# Patient Record
Sex: MALE | Race: WHITE | NOT HISPANIC OR LATINO | Employment: FULL TIME | URBAN - METROPOLITAN AREA
[De-identification: names, ages, dates, MRNs, and addresses within clinical notes are randomized per-mention and may not be internally consistent; named-entity substitution may affect disease eponyms.]

---

## 2019-01-24 ENCOUNTER — OFFICE VISIT (OUTPATIENT)
Dept: URGENT CARE | Facility: PHYSICIAN GROUP | Age: 39
End: 2019-01-24
Payer: COMMERCIAL

## 2019-01-24 VITALS
HEART RATE: 74 BPM | TEMPERATURE: 98.6 F | OXYGEN SATURATION: 96 % | RESPIRATION RATE: 16 BRPM | HEIGHT: 69 IN | BODY MASS INDEX: 34.96 KG/M2 | SYSTOLIC BLOOD PRESSURE: 122 MMHG | WEIGHT: 236 LBS | DIASTOLIC BLOOD PRESSURE: 70 MMHG

## 2019-01-24 DIAGNOSIS — J02.9 PHARYNGITIS, UNSPECIFIED ETIOLOGY: ICD-10-CM

## 2019-01-24 DIAGNOSIS — Z20.818 EXPOSURE TO STREP THROAT: ICD-10-CM

## 2019-01-24 LAB
INT CON NEG: NEGATIVE
INT CON POS: POSITIVE
S PYO AG THROAT QL: NORMAL

## 2019-01-24 PROCEDURE — 87880 STREP A ASSAY W/OPTIC: CPT | Performed by: PHYSICIAN ASSISTANT

## 2019-01-24 PROCEDURE — 99203 OFFICE O/P NEW LOW 30 MIN: CPT | Performed by: PHYSICIAN ASSISTANT

## 2019-01-24 RX ORDER — AMOXICILLIN 500 MG/1
500 CAPSULE ORAL 3 TIMES DAILY
Qty: 30 CAP | Refills: 0 | Status: SHIPPED | OUTPATIENT
Start: 2019-01-24 | End: 2019-02-03

## 2019-01-24 ASSESSMENT — ENCOUNTER SYMPTOMS
SHORTNESS OF BREATH: 0
CHILLS: 0
MYALGIAS: 0
TROUBLE SWALLOWING: 0
DIZZINESS: 0
NAUSEA: 0
SORE THROAT: 1
PALPITATIONS: 0
HEADACHES: 0
COUGH: 1
BLURRED VISION: 0
EYE PAIN: 0
SWOLLEN GLANDS: 0
VOMITING: 0
FEVER: 0

## 2019-01-24 NOTE — PROGRESS NOTES
"  Subjective:   Aaron Flowers a 38 y.o. male who presents for Sore Throat (wife was dx w/ strep, )      Pharyngitis    This is a new problem. The current episode started today. The problem has been unchanged. Neither side of throat is experiencing more pain than the other. There has been no fever. The pain is mild. Associated symptoms include coughing. Pertinent negatives include no congestion, drooling, ear pain, headaches, shortness of breath, swollen glands, trouble swallowing or vomiting. He has had exposure to strep. He has had no exposure to mono. Exposure to: His girlfriend was diagnosed with strep throat this morning. He has tried nothing for the symptoms.       Review of Systems   Constitutional: Negative for chills, fever and malaise/fatigue.   HENT: Positive for sore throat. Negative for congestion, drooling, ear pain and trouble swallowing.    Eyes: Negative for blurred vision and pain.   Respiratory: Positive for cough. Negative for shortness of breath.    Cardiovascular: Negative for chest pain and palpitations.   Gastrointestinal: Negative for nausea and vomiting.   Musculoskeletal: Negative for myalgias.   Skin: Negative for rash.   Neurological: Negative for dizziness and headaches.        PMH:  has no past medical history on file.  MEDS:   Current Outpatient Prescriptions:   •  amoxicillin (AMOXIL) 500 MG Cap, Take 1 Cap by mouth 3 times a day for 10 days., Disp: 30 Cap, Rfl: 0  ALLERGIES: No Known Allergies  SURGHX: No past surgical history on file.  SOCHX:  reports that he has never smoked. He has never used smokeless tobacco. He reports that he uses drugs, including Marijuana.  FH: Family history was reviewed, no pertinent findings to report     Objective:   /70   Pulse 74   Temp 37 °C (98.6 °F)   Resp 16   Ht 1.753 m (5' 9\")   Wt 107 kg (236 lb)   SpO2 96%   BMI 34.85 kg/m²      Physical Exam   Constitutional: He is oriented to person, place, and time. He appears well-developed " and well-nourished.   HENT:   Head: Normocephalic and atraumatic.   Right Ear: Tympanic membrane, external ear and ear canal normal.   Left Ear: Tympanic membrane, external ear and ear canal normal.   Nose: Nose normal.   Mouth/Throat: Uvula is midline, oropharynx is clear and moist and mucous membranes are normal. Tonsils are 1+ on the right. Tonsils are 1+ on the left. No tonsillar exudate.   Eyes: Pupils are equal, round, and reactive to light. Conjunctivae are normal.   Neck: Normal range of motion.   Cardiovascular: Normal rate, regular rhythm and normal heart sounds.    No murmur heard.  Pulmonary/Chest: Effort normal and breath sounds normal. He has no wheezes.   Lymphadenopathy:     He has no cervical adenopathy.   Neurological: He is alert and oriented to person, place, and time.   Skin: Skin is warm and dry. Capillary refill takes less than 2 seconds.   Psychiatric: He has a normal mood and affect. His behavior is normal. Judgment normal.   Vitals reviewed.       POCT Rapid Strep A - Negative    Assessment/Plan:   1. Exposure to strep throat  - POCT Rapid Strep A  - amoxicillin (AMOXIL) 500 MG Cap; Take 1 Cap by mouth 3 times a day for 10 days.  Dispense: 30 Cap; Refill: 0    2. Pharyngitis, unspecified etiology  - POCT Rapid Strep A  - amoxicillin (AMOXIL) 500 MG Cap; Take 1 Cap by mouth 3 times a day for 10 days.  Dispense: 30 Cap; Refill: 0    *Contingent antibiotic prescription given to patient to be filled if there is no improvement in his sore throat over the next 2 days      Differential diagnosis, natural history, supportive care, and indications for immediate follow-up discussed.  Return if symptoms worsen or fail to improve.

## 2019-12-11 ENCOUNTER — OFFICE VISIT (OUTPATIENT)
Dept: URGENT CARE | Facility: PHYSICIAN GROUP | Age: 39
End: 2019-12-11
Payer: COMMERCIAL

## 2019-12-11 VITALS
RESPIRATION RATE: 12 BRPM | HEIGHT: 69 IN | SYSTOLIC BLOOD PRESSURE: 138 MMHG | OXYGEN SATURATION: 96 % | TEMPERATURE: 98.1 F | DIASTOLIC BLOOD PRESSURE: 102 MMHG | HEART RATE: 79 BPM | WEIGHT: 241.8 LBS | BODY MASS INDEX: 35.81 KG/M2

## 2019-12-11 DIAGNOSIS — R05.9 COUGH: ICD-10-CM

## 2019-12-11 DIAGNOSIS — J06.9 VIRAL URI: ICD-10-CM

## 2019-12-11 DIAGNOSIS — R11.2 NAUSEA AND VOMITING, INTRACTABILITY OF VOMITING NOT SPECIFIED, UNSPECIFIED VOMITING TYPE: ICD-10-CM

## 2019-12-11 DIAGNOSIS — R19.5 CHANGE IN STOOL: ICD-10-CM

## 2019-12-11 PROCEDURE — 99214 OFFICE O/P EST MOD 30 MIN: CPT | Performed by: PHYSICIAN ASSISTANT

## 2019-12-11 RX ORDER — LISINOPRIL 5 MG/1
5 TABLET ORAL DAILY
COMMUNITY
End: 2022-10-11

## 2019-12-11 RX ORDER — ALBUTEROL SULFATE 90 UG/1
2 AEROSOL, METERED RESPIRATORY (INHALATION) EVERY 6 HOURS PRN
Qty: 8.5 G | Refills: 2 | Status: SHIPPED | OUTPATIENT
Start: 2019-12-11 | End: 2022-10-11

## 2019-12-11 ASSESSMENT — ENCOUNTER SYMPTOMS
CONSTIPATION: 0
SHORTNESS OF BREATH: 0
FEVER: 0
BLOOD IN STOOL: 0
SPUTUM PRODUCTION: 0
COUGH: 1
CHILLS: 0
WHEEZING: 0
VOMITING: 0
DIARRHEA: 0
NAUSEA: 0
ABDOMINAL PAIN: 0

## 2019-12-11 NOTE — PROGRESS NOTES
"Subjective:   Aaron hKan  is a 39 y.o. male who presents for Nausea (vomiting, upset stomach, cough, cold sweats, light headedness, x2 days)        Nausea   This is a new problem. The current episode started yesterday. Associated symptoms include coughing. Pertinent negatives include no abdominal pain, chills, fever, nausea, rash or vomiting ( Only posttussive).     Patient comes clinic describing last 2 days of persistent coughing.  Notes cough worse in the morning.  Notes cough is dry and spastic.  Describes 2 episodes of posttussive emesis yesterday and again this morning.  Denies fevers chills.  Denies past medical history of similar.  Denies history of asthma bronchitis pneumonia.  Denies sore throat or ear pain notes mild sinus congestion.  Denies persistent nausea through the day-notes improves immediately after episode of post tussive emesis each morning.  Denies abdominal pain diarrhea or rash.  Mentions \"I did have some abdominal discomfort the other day but it went away\".  Notes childhood history of appendectomy.  Denies other history of abdominal surgeries.  Denies diarrhea constipation blood per stool but does note some \"darker colored my stool recently.  Patient denies stool appearing as coffee grounds or to black color but \"just a little darker than it used to be\".  Patient denies any historical epigastric pains, denies nighttime abdominal pains, denies history of GERD symptoms.  Denies any treatments tried over the last 2 days.  Notes cough and has inhaler but did not think to try it.  Comes to clinic requesting work note.  States he does have primary care.    Review of Systems   Constitutional: Negative for chills and fever.   Respiratory: Positive for cough. Negative for sputum production, shortness of breath and wheezing.    Gastrointestinal: Negative for abdominal pain, blood in stool, constipation, diarrhea, nausea and vomiting ( Only posttussive). Melena:  ?  Some darker coloration. " "  Skin: Negative for rash.   Endo/Heme/Allergies: Negative for environmental allergies.     No Known Allergies   I have worn a mask for the entire encounter with this patient.    Objective:   /102 (BP Location: Right arm, Patient Position: Sitting, BP Cuff Size: Adult)   Pulse 79   Temp 36.7 °C (98.1 °F) (Temporal)   Resp 12   Ht 1.753 m (5' 9\")   Wt 109.7 kg (241 lb 12.8 oz)   SpO2 96%   BMI 35.71 kg/m²   Physical Exam  Vitals signs and nursing note reviewed.   Constitutional:       General: He is not in acute distress.     Appearance: Normal appearance. He is well-developed. He is not diaphoretic.   HENT:      Head: Normocephalic and atraumatic.      Right Ear: Tympanic membrane, ear canal and external ear normal.      Left Ear: Tympanic membrane, ear canal and external ear normal.      Nose: Nose normal.      Mouth/Throat:      Pharynx: Uvula midline. Posterior oropharyngeal erythema ( mild PND) present. No oropharyngeal exudate.      Tonsils: No tonsillar abscesses.   Eyes:      General: No scleral icterus.        Right eye: No discharge.         Left eye: No discharge.      Conjunctiva/sclera: Conjunctivae normal.   Neck:      Musculoskeletal: Neck supple.   Pulmonary:      Effort: Pulmonary effort is normal. No respiratory distress.      Breath sounds: Normal breath sounds. No stridor. No decreased breath sounds, wheezing, rhonchi or rales.   Abdominal:      General: Abdomen is flat. Bowel sounds are increased. There is no distension.      Palpations: Abdomen is soft. Abdomen is not rigid.      Tenderness: There is no tenderness. There is no right CVA tenderness, left CVA tenderness, guarding or rebound. Negative signs include Garcia's sign and McBurney's sign.   Musculoskeletal: Normal range of motion.   Lymphadenopathy:      Cervical: No cervical adenopathy.   Skin:     General: Skin is warm and dry.      Coloration: Skin is not pale.   Neurological:      Mental Status: He is alert and oriented " "to person, place, and time.      Coordination: Coordination normal.           Assessment/Plan:   1. Nausea and vomiting, intractability of vomiting not specified, unspecified vomiting type  - albuterol 108 (90 Base) MCG/ACT Aero Soln inhalation aerosol; Inhale 2 Puffs by mouth every 6 hours as needed for Shortness of Breath.  Dispense: 8.5 g; Refill: 2    2. Cough    3. Viral URI    4. Change in stool  - REFERRAL TO GASTROENTEROLOGY    Other orders  - lisinopril (PRINIVIL) 5 MG Tab; Take 5 mg by mouth every day.  Supportive care is reviewed with patient/caregiver - recommend to push PO fluids and electrolytes, sent w/ MDI, discussed likely viral URI w/ normal vitals, normal exam and no abdominal pain of concern-sent with gastroenterology referral due to \"darkened stools\"-we discussed red flag symptoms including persistent/worsening of the symptoms and/or abdominal pain -patient is encouraged to follow-up with primary care-to ER with acute worsening-we discussed supportive care symptoms and he is sent with a work note  Return to clinic with lack of resolution or progression of symptoms.  ER precautions with any worsening symptoms are reviewed with patient/caregiver and they do express understanding    Differential diagnosis, natural history, supportive care, and indications for immediate follow-up discussed.       "

## 2019-12-11 NOTE — LETTER
December 11, 2019       Patient: Aaron Khan   YOB: 1980   Date of Visit: 12/11/2019         To Whom It May Concern:    It is my medical opinion that Aaron Khan should be excused from work for yesterday and today due to illness.        If you have any questions or concerns, please don't hesitate to call [phone]          Sincerely,          Garfield Verdugo P.A.-C.  Electronically Signed

## 2022-10-05 ENCOUNTER — TELEPHONE (OUTPATIENT)
Dept: SCHEDULING | Facility: IMAGING CENTER | Age: 42
End: 2022-10-05

## 2022-10-07 SDOH — ECONOMIC STABILITY: INCOME INSECURITY: IN THE LAST 12 MONTHS, WAS THERE A TIME WHEN YOU WERE NOT ABLE TO PAY THE MORTGAGE OR RENT ON TIME?: NO

## 2022-10-07 SDOH — HEALTH STABILITY: MENTAL HEALTH
STRESS IS WHEN SOMEONE FEELS TENSE, NERVOUS, ANXIOUS, OR CAN'T SLEEP AT NIGHT BECAUSE THEIR MIND IS TROUBLED. HOW STRESSED ARE YOU?: RATHER MUCH

## 2022-10-07 SDOH — ECONOMIC STABILITY: TRANSPORTATION INSECURITY
IN THE PAST 12 MONTHS, HAS LACK OF RELIABLE TRANSPORTATION KEPT YOU FROM MEDICAL APPOINTMENTS, MEETINGS, WORK OR FROM GETTING THINGS NEEDED FOR DAILY LIVING?: NO

## 2022-10-07 SDOH — ECONOMIC STABILITY: HOUSING INSECURITY
IN THE LAST 12 MONTHS, WAS THERE A TIME WHEN YOU DID NOT HAVE A STEADY PLACE TO SLEEP OR SLEPT IN A SHELTER (INCLUDING NOW)?: NO

## 2022-10-07 SDOH — ECONOMIC STABILITY: FOOD INSECURITY: WITHIN THE PAST 12 MONTHS, THE FOOD YOU BOUGHT JUST DIDN'T LAST AND YOU DIDN'T HAVE MONEY TO GET MORE.: NEVER TRUE

## 2022-10-07 SDOH — HEALTH STABILITY: PHYSICAL HEALTH: ON AVERAGE, HOW MANY MINUTES DO YOU ENGAGE IN EXERCISE AT THIS LEVEL?: 40 MIN

## 2022-10-07 SDOH — ECONOMIC STABILITY: FOOD INSECURITY: WITHIN THE PAST 12 MONTHS, YOU WORRIED THAT YOUR FOOD WOULD RUN OUT BEFORE YOU GOT MONEY TO BUY MORE.: NEVER TRUE

## 2022-10-07 SDOH — ECONOMIC STABILITY: HOUSING INSECURITY: IN THE LAST 12 MONTHS, HOW MANY PLACES HAVE YOU LIVED?: 1

## 2022-10-07 SDOH — ECONOMIC STABILITY: TRANSPORTATION INSECURITY
IN THE PAST 12 MONTHS, HAS THE LACK OF TRANSPORTATION KEPT YOU FROM MEDICAL APPOINTMENTS OR FROM GETTING MEDICATIONS?: NO

## 2022-10-07 SDOH — HEALTH STABILITY: PHYSICAL HEALTH: ON AVERAGE, HOW MANY DAYS PER WEEK DO YOU ENGAGE IN MODERATE TO STRENUOUS EXERCISE (LIKE A BRISK WALK)?: 3 DAYS

## 2022-10-07 SDOH — ECONOMIC STABILITY: INCOME INSECURITY: HOW HARD IS IT FOR YOU TO PAY FOR THE VERY BASICS LIKE FOOD, HOUSING, MEDICAL CARE, AND HEATING?: NOT HARD AT ALL

## 2022-10-07 SDOH — ECONOMIC STABILITY: TRANSPORTATION INSECURITY
IN THE PAST 12 MONTHS, HAS LACK OF TRANSPORTATION KEPT YOU FROM MEETINGS, WORK, OR FROM GETTING THINGS NEEDED FOR DAILY LIVING?: NO

## 2022-10-07 ASSESSMENT — SOCIAL DETERMINANTS OF HEALTH (SDOH)
HOW OFTEN DO YOU ATTEND CHURCH OR RELIGIOUS SERVICES?: MORE THAN 4 TIMES PER YEAR
WITHIN THE PAST 12 MONTHS, YOU WORRIED THAT YOUR FOOD WOULD RUN OUT BEFORE YOU GOT THE MONEY TO BUY MORE: NEVER TRUE
HOW OFTEN DO YOU GET TOGETHER WITH FRIENDS OR RELATIVES?: ONCE A WEEK
IN A TYPICAL WEEK, HOW MANY TIMES DO YOU TALK ON THE PHONE WITH FAMILY, FRIENDS, OR NEIGHBORS?: TWICE A WEEK
IN A TYPICAL WEEK, HOW MANY TIMES DO YOU TALK ON THE PHONE WITH FAMILY, FRIENDS, OR NEIGHBORS?: TWICE A WEEK
HOW OFTEN DO YOU ATTEND CHURCH OR RELIGIOUS SERVICES?: MORE THAN 4 TIMES PER YEAR
HOW OFTEN DO YOU ATTENT MEETINGS OF THE CLUB OR ORGANIZATION YOU BELONG TO?: NEVER
HOW OFTEN DO YOU GET TOGETHER WITH FRIENDS OR RELATIVES?: ONCE A WEEK
HOW MANY DRINKS CONTAINING ALCOHOL DO YOU HAVE ON A TYPICAL DAY WHEN YOU ARE DRINKING: 5 OR 6
HOW OFTEN DO YOU HAVE A DRINK CONTAINING ALCOHOL: 2-4 TIMES A MONTH
DO YOU BELONG TO ANY CLUBS OR ORGANIZATIONS SUCH AS CHURCH GROUPS UNIONS, FRATERNAL OR ATHLETIC GROUPS, OR SCHOOL GROUPS?: NO
HOW OFTEN DO YOU ATTENT MEETINGS OF THE CLUB OR ORGANIZATION YOU BELONG TO?: NEVER
HOW OFTEN DO YOU HAVE SIX OR MORE DRINKS ON ONE OCCASION: MONTHLY
DO YOU BELONG TO ANY CLUBS OR ORGANIZATIONS SUCH AS CHURCH GROUPS UNIONS, FRATERNAL OR ATHLETIC GROUPS, OR SCHOOL GROUPS?: NO
HOW HARD IS IT FOR YOU TO PAY FOR THE VERY BASICS LIKE FOOD, HOUSING, MEDICAL CARE, AND HEATING?: NOT HARD AT ALL

## 2022-10-07 ASSESSMENT — LIFESTYLE VARIABLES
HOW OFTEN DO YOU HAVE SIX OR MORE DRINKS ON ONE OCCASION: MONTHLY
SKIP TO QUESTIONS 9-10: 0
HOW MANY STANDARD DRINKS CONTAINING ALCOHOL DO YOU HAVE ON A TYPICAL DAY: 5 OR 6
AUDIT-C TOTAL SCORE: 6
HOW OFTEN DO YOU HAVE A DRINK CONTAINING ALCOHOL: 2-4 TIMES A MONTH

## 2022-10-11 ENCOUNTER — OFFICE VISIT (OUTPATIENT)
Dept: MEDICAL GROUP | Facility: PHYSICIAN GROUP | Age: 42
End: 2022-10-11
Payer: COMMERCIAL

## 2022-10-11 VITALS
HEART RATE: 77 BPM | OXYGEN SATURATION: 98 % | HEIGHT: 69 IN | BODY MASS INDEX: 35.73 KG/M2 | TEMPERATURE: 99.1 F | WEIGHT: 241.2 LBS | SYSTOLIC BLOOD PRESSURE: 132 MMHG | DIASTOLIC BLOOD PRESSURE: 84 MMHG

## 2022-10-11 DIAGNOSIS — Z23 NEED FOR VACCINATION: ICD-10-CM

## 2022-10-11 DIAGNOSIS — E55.9 VITAMIN D DEFICIENCY: ICD-10-CM

## 2022-10-11 DIAGNOSIS — Z00.00 ENCOUNTER FOR MEDICAL EXAMINATION TO ESTABLISH CARE: ICD-10-CM

## 2022-10-11 DIAGNOSIS — H81.11 BENIGN PAROXYSMAL POSITIONAL VERTIGO OF RIGHT EAR: ICD-10-CM

## 2022-10-11 DIAGNOSIS — D22.9 ATYPICAL MOLE: ICD-10-CM

## 2022-10-11 DIAGNOSIS — I10 HYPERTENSION, UNSPECIFIED TYPE: ICD-10-CM

## 2022-10-11 PROBLEM — H81.10 BPV (BENIGN POSITIONAL VERTIGO): Status: ACTIVE | Noted: 2022-10-11

## 2022-10-11 PROCEDURE — 90715 TDAP VACCINE 7 YRS/> IM: CPT

## 2022-10-11 PROCEDURE — 90471 IMMUNIZATION ADMIN: CPT

## 2022-10-11 PROCEDURE — 99214 OFFICE O/P EST MOD 30 MIN: CPT | Mod: 25

## 2022-10-11 ASSESSMENT — PATIENT HEALTH QUESTIONNAIRE - PHQ9: CLINICAL INTERPRETATION OF PHQ2 SCORE: 0

## 2022-10-11 NOTE — ASSESSMENT & PLAN NOTE
Chronic, worsening.  Upon assessment, his mole does have an irregular border and is discolore. I will refer to dermatology for biopsy.

## 2022-10-11 NOTE — PROGRESS NOTES
"Subjective:     CC:    Chief Complaint   Patient presents with    Establish Care       HISTORY OF THE PRESENT ILLNESS: Aaron is a pleasant 42 y.o. male here today to establish care and discuss the chronic conditions below. He was previously followed by [facility].     Problem   Hypertension    He was started on Lisinopril 5 mg around 2019 and stopped taking it during covid. He just wasn't attending appointments and ran out of medication. He has been off of this for 2.5 years.   He doesn't monitor his BP at home. He has gained weight and admits to being under stress.     Bmi 35.0-35.9,Adult   Vitamin D Deficiency    Patient was placed on a weekly Vitamin D supplement for this.  He now manages with a daily multivitamin.     Bpv (Benign Positional Vertigo)    Patient went to an ENT in the past and was told he has scar tissue contributing to BPV. He was shown how to do epley maneuver and he has had good results. No issues for a while     Atypical Mole    Patient has a mole near his right temple.   It has grown in size and became bigger since he first noticed it three years ago.           Health Maintenance: Completed    ROS:   All systems negative except as addressed in assessment and plan.     Objective:     Exam: /84 (BP Location: Left arm, Patient Position: Sitting, BP Cuff Size: Adult)   Pulse 77   Temp 37.3 °C (99.1 °F) (Temporal)   Ht 1.753 m (5' 9\")   Wt 109 kg (241 lb 3.2 oz)   SpO2 98%  Body mass index is 35.62 kg/m².    Physical Exam  Constitutional:       Appearance: Normal appearance.   Cardiovascular:      Rate and Rhythm: Normal rate.   Pulmonary:      Effort: Pulmonary effort is normal.   Musculoskeletal:         General: Normal range of motion.   Skin:     Comments: +Mole on right temple, irregular borders. Large. Discolored   Neurological:      Mental Status: He is alert. Mental status is at baseline.   Psychiatric:         Mood and Affect: Mood normal.         Behavior: " Behavior normal.     Labs: No labs on file to review      Assessment & Plan:   42 y.o. male with the following -    1. Encounter for medical examination to establish care  Health conditions and medications reviewed and updated. All screenings discussed and up-to-date. Health maintenance completed.     - Comp Metabolic Panel; Future  - CBC WITH DIFFERENTIAL; Future  - VITAMIN D,25 HYDROXY (DEFICIENCY); Future  - TSH WITH REFLEX TO FT4; Future    2. Hypertension, unspecified type  Chronic, stable.  132/84 in clinic.   He was started on Lisinopril 5mg for BP in 140s but has been off of this for 2.5 years.   Patient will monitor his blood pressure at home and send me readings via [employer].    3. Benign paroxysmal positional vertigo of right ear    4. Atypical mole  Chronic, worsening.  Upon assessment, his mole does have an irregular border and is discolore. I will refer to dermatology for biopsy.  - Referral to Dermatology    5. Need for vaccination  - Tdap Vaccine =>6YO IM    6. BMI 35.0-35.9,adult  - Lipid Profile; Future  - HEMOGLOBIN A1C; Future    7. Vitamin D deficiency  - VITAMIN D,25 HYDROXY (DEFICIENCY); Future    Patient was educated in proper administration of medication(s) ordered today including safety, possible SE, risks, benefits, rationale and alternatives to therapy.   Supportive care, differential diagnoses, and indications for immediate follow-up discussed with patient.    Pathogenesis of diagnosis discussed including typical length and natural progression.    Instructed to return to clinic or nearest emergency department for any change in condition, further concerns, or worsening of symptoms.  Patient states understanding of the plan of care and discharge instructions.    Return in about 1 year (around 10/11/2023) for Wellness Visit.    I have placed the above orders and discussed them with an approved delegating provider.  The MA is performing the below orders under the direction of   Danilo.    Please note that this dictation was created using voice recognition software. I have worked with consultants from the vendor as well as technical experts from [facility] to optimize the interface. I have made every reasonable attempt to correct obvious errors, but I expect that there are errors of grammar and possibly content that I did not discover before finalizing the note.

## 2022-10-19 ENCOUNTER — HOSPITAL ENCOUNTER (OUTPATIENT)
Dept: LAB | Facility: MEDICAL CENTER | Age: 42
End: 2022-10-19
Payer: COMMERCIAL

## 2022-10-19 DIAGNOSIS — Z00.00 ENCOUNTER FOR MEDICAL EXAMINATION TO ESTABLISH CARE: ICD-10-CM

## 2022-10-19 DIAGNOSIS — E55.9 VITAMIN D DEFICIENCY: ICD-10-CM

## 2022-10-19 LAB
25(OH)D3 SERPL-MCNC: 41 NG/ML (ref 30–100)
ALBUMIN SERPL BCP-MCNC: 4.8 G/DL (ref 3.2–4.9)
ALBUMIN/GLOB SERPL: 1.5 G/DL
ALP SERPL-CCNC: 88 U/L (ref 30–99)
ALT SERPL-CCNC: 54 U/L (ref 2–50)
ANION GAP SERPL CALC-SCNC: 12 MMOL/L (ref 7–16)
AST SERPL-CCNC: 28 U/L (ref 12–45)
BASOPHILS # BLD AUTO: 0.9 % (ref 0–1.8)
BASOPHILS # BLD: 0.06 K/UL (ref 0–0.12)
BILIRUB SERPL-MCNC: 0.7 MG/DL (ref 0.1–1.5)
BUN SERPL-MCNC: 19 MG/DL (ref 8–22)
CALCIUM SERPL-MCNC: 9.9 MG/DL (ref 8.5–10.5)
CHLORIDE SERPL-SCNC: 100 MMOL/L (ref 96–112)
CHOLEST SERPL-MCNC: 192 MG/DL (ref 100–199)
CO2 SERPL-SCNC: 29 MMOL/L (ref 20–33)
CREAT SERPL-MCNC: 0.89 MG/DL (ref 0.5–1.4)
EOSINOPHIL # BLD AUTO: 0.2 K/UL (ref 0–0.51)
EOSINOPHIL NFR BLD: 2.9 % (ref 0–6.9)
ERYTHROCYTE [DISTWIDTH] IN BLOOD BY AUTOMATED COUNT: 43.2 FL (ref 35.9–50)
EST. AVERAGE GLUCOSE BLD GHB EST-MCNC: 114 MG/DL
FASTING STATUS PATIENT QL REPORTED: NORMAL
GFR SERPLBLD CREATININE-BSD FMLA CKD-EPI: 110 ML/MIN/1.73 M 2
GLOBULIN SER CALC-MCNC: 3.3 G/DL (ref 1.9–3.5)
GLUCOSE SERPL-MCNC: 108 MG/DL (ref 65–99)
HBA1C MFR BLD: 5.6 % (ref 4–5.6)
HCT VFR BLD AUTO: 47.6 % (ref 42–52)
HDLC SERPL-MCNC: 51 MG/DL
HGB BLD-MCNC: 16.7 G/DL (ref 14–18)
IMM GRANULOCYTES # BLD AUTO: 0.01 K/UL (ref 0–0.11)
IMM GRANULOCYTES NFR BLD AUTO: 0.1 % (ref 0–0.9)
LDLC SERPL CALC-MCNC: 97 MG/DL
LYMPHOCYTES # BLD AUTO: 2.28 K/UL (ref 1–4.8)
LYMPHOCYTES NFR BLD: 33 % (ref 22–41)
MCH RBC QN AUTO: 32.2 PG (ref 27–33)
MCHC RBC AUTO-ENTMCNC: 35.1 G/DL (ref 33.7–35.3)
MCV RBC AUTO: 91.9 FL (ref 81.4–97.8)
MONOCYTES # BLD AUTO: 0.53 K/UL (ref 0–0.85)
MONOCYTES NFR BLD AUTO: 7.7 % (ref 0–13.4)
NEUTROPHILS # BLD AUTO: 3.82 K/UL (ref 1.82–7.42)
NEUTROPHILS NFR BLD: 55.4 % (ref 44–72)
NRBC # BLD AUTO: 0 K/UL
NRBC BLD-RTO: 0 /100 WBC
PLATELET # BLD AUTO: 259 K/UL (ref 164–446)
PMV BLD AUTO: 10.7 FL (ref 9–12.9)
POTASSIUM SERPL-SCNC: 4.4 MMOL/L (ref 3.6–5.5)
PROT SERPL-MCNC: 8.1 G/DL (ref 6–8.2)
RBC # BLD AUTO: 5.18 M/UL (ref 4.7–6.1)
SODIUM SERPL-SCNC: 141 MMOL/L (ref 135–145)
T4 FREE SERPL-MCNC: 1.22 NG/DL (ref 0.93–1.7)
TRIGL SERPL-MCNC: 218 MG/DL (ref 0–149)
TSH SERPL DL<=0.005 MIU/L-ACNC: 5.74 UIU/ML (ref 0.38–5.33)
WBC # BLD AUTO: 6.9 K/UL (ref 4.8–10.8)

## 2022-10-19 PROCEDURE — 84443 ASSAY THYROID STIM HORMONE: CPT

## 2022-10-19 PROCEDURE — 83036 HEMOGLOBIN GLYCOSYLATED A1C: CPT

## 2022-10-19 PROCEDURE — 36415 COLL VENOUS BLD VENIPUNCTURE: CPT

## 2022-10-19 PROCEDURE — 85025 COMPLETE CBC W/AUTO DIFF WBC: CPT

## 2022-10-19 PROCEDURE — 80053 COMPREHEN METABOLIC PANEL: CPT

## 2022-10-19 PROCEDURE — 80061 LIPID PANEL: CPT

## 2022-10-19 PROCEDURE — 84439 ASSAY OF FREE THYROXINE: CPT

## 2022-10-19 PROCEDURE — 82306 VITAMIN D 25 HYDROXY: CPT

## 2022-10-20 DIAGNOSIS — E03.8 SUBCLINICAL HYPOTHYROIDISM: ICD-10-CM

## 2022-10-20 DIAGNOSIS — R79.89 ELEVATED TSH: ICD-10-CM

## 2022-10-20 DIAGNOSIS — E78.1 HIGH BLOOD TRIGLYCERIDES: ICD-10-CM

## 2022-10-20 DIAGNOSIS — Z00.00 WELLNESS EXAMINATION: ICD-10-CM

## 2022-10-26 DIAGNOSIS — I10 HYPERTENSION, UNSPECIFIED TYPE: ICD-10-CM

## 2022-10-26 RX ORDER — LISINOPRIL 10 MG/1
10 TABLET ORAL DAILY
Qty: 90 TABLET | Refills: 3 | Status: SHIPPED | OUTPATIENT
Start: 2022-10-26

## 2023-01-18 ENCOUNTER — APPOINTMENT (OUTPATIENT)
Dept: RADIOLOGY | Facility: IMAGING CENTER | Age: 43
End: 2023-01-18
Attending: PHYSICIAN ASSISTANT
Payer: COMMERCIAL

## 2023-01-18 ENCOUNTER — OFFICE VISIT (OUTPATIENT)
Dept: URGENT CARE | Facility: PHYSICIAN GROUP | Age: 43
End: 2023-01-18
Payer: COMMERCIAL

## 2023-01-18 VITALS
HEIGHT: 69 IN | WEIGHT: 243 LBS | SYSTOLIC BLOOD PRESSURE: 112 MMHG | DIASTOLIC BLOOD PRESSURE: 68 MMHG | HEART RATE: 96 BPM | RESPIRATION RATE: 20 BRPM | BODY MASS INDEX: 35.99 KG/M2 | TEMPERATURE: 97.6 F | OXYGEN SATURATION: 96 %

## 2023-01-18 DIAGNOSIS — M25.552 LEFT HIP PAIN: ICD-10-CM

## 2023-01-18 PROCEDURE — 73501 X-RAY EXAM HIP UNI 1 VIEW: CPT | Mod: TC,LT | Performed by: RADIOLOGY

## 2023-01-18 PROCEDURE — 99214 OFFICE O/P EST MOD 30 MIN: CPT | Performed by: PHYSICIAN ASSISTANT

## 2023-01-18 RX ORDER — NAPROXEN 500 MG/1
500 TABLET ORAL 2 TIMES DAILY WITH MEALS
Qty: 30 TABLET | Refills: 0 | Status: SHIPPED | OUTPATIENT
Start: 2023-01-18

## 2023-01-18 ASSESSMENT — ENCOUNTER SYMPTOMS
BACK PAIN: 0
NECK PAIN: 0
VOMITING: 0
MYALGIAS: 1
HEADACHES: 0
NAUSEA: 0
FEVER: 0
FALLS: 0

## 2023-01-18 ASSESSMENT — FIBROSIS 4 INDEX: FIB4 SCORE: 0.6

## 2023-01-18 NOTE — PROGRESS NOTES
Subjective:   Aaron Khan is a 42 y.o. male who presents for Hip Pain (Left side,painful,x3 days)        Patient presents for evaluation of left knee pain that began 3 days ago.  Pain began after snow removal at home.  Prior to engaging in snow removal his left knee was hurting slightly-this is a chronic problem for him.  That evening he developed gradual onset of left hip pain.  He denies low back pain and buttock pain.  He points to the anterolateral left hip as the site of pain.  Sitting/rest improves his pain.  However when he recommences activity he states that his pain becomes severe.  As he begins moving his pain does lessen.  States that the area felt slightly numb last night, but he is not currently experiencing this sensation.  No other reported numbness or tingling.  No lower extremity weakness, no loss of bowel or bladder control, no saddle dysesthesias.  He took 400 mg of ibuprofen last night with mild symptomatic relief.    Review of Systems   Constitutional:  Negative for fever and malaise/fatigue.   Gastrointestinal:  Negative for nausea and vomiting.   Musculoskeletal:  Positive for joint pain and myalgias. Negative for back pain, falls and neck pain.   Neurological:  Negative for headaches.     PMH:  has no past medical history on file.  MEDS:   Current Outpatient Medications:     naproxen (NAPROSYN) 500 MG Tab, Take 1 Tablet by mouth 2 times a day with meals., Disp: 30 Tablet, Rfl: 0    lisinopril (PRINIVIL) 10 MG Tab, Take 1 Tablet by mouth every day., Disp: 90 Tablet, Rfl: 3  ALLERGIES: No Known Allergies  SURGHX: History reviewed. No pertinent surgical history.  SOCHX:  reports that he has never smoked. He has never used smokeless tobacco. He reports current alcohol use of about 3.6 oz per week. He reports current drug use. Frequency: 1.00 time per week. Drugs: Marijuana and Inhaled.  FH: Family history was reviewed, no pertinent findings to report   Objective:   /68 (BP Location:  "Right arm, Patient Position: Sitting, BP Cuff Size: Adult)   Pulse 96   Temp 36.4 °C (97.6 °F) (Temporal)   Resp 20   Ht 1.753 m (5' 9\")   Wt 110 kg (243 lb)   SpO2 96%   BMI 35.88 kg/m²   Physical Exam  Vitals reviewed.   Constitutional:       General: He is not in acute distress.     Appearance: Normal appearance. He is well-developed. He is not toxic-appearing.   HENT:      Head: Normocephalic and atraumatic.      Right Ear: External ear normal.      Left Ear: External ear normal.      Nose: Nose normal.   Cardiovascular:      Rate and Rhythm: Normal rate and regular rhythm.   Pulmonary:      Effort: Pulmonary effort is normal. No respiratory distress.      Breath sounds: No stridor.   Musculoskeletal:      Comments: Back: No midline tenderness with palpation of the thoracic and lumbar spine.  No palpable step-offs or deformities med and SI joints nontender to palpation.  Resisted hip, knee, ankle flexion/extension 5 out of 5 bilaterally.  Lower extremity sensation intact and even bilaterally.  Patellar reflexes 2+ bilaterally.    Left anterolateral hip tender to palpation.  No palpable step-offs or deformities.  Positive MAKAYLA.  Negative FADIR.   Skin:     General: Skin is dry.   Neurological:      Comments: Alert and oriented.    Psychiatric:         Speech: Speech normal.         Behavior: Behavior normal.       Imaging:  COMPARISON: None     FINDINGS:  No acute fracture or malalignment.     The hip joints are symmetric and mildly narrowed superiorly in both hips.     There is a left femoral head neck bump.     SI joints and pubic symphysis are normal.     IMPRESSION:     1.  There is mild superior hip joint space during bilaterally.  2.  Left femoral head neck bump which can be seen with acetabular impingement syndromes.    Assessment/Plan:   1. Left hip pain  - DX-HIP-UNILATERAL-WITH PELVIS-1 VIEW LEFT; Future  - naproxen (NAPROSYN) 500 MG Tab; Take 1 Tablet by mouth 2 times a day with meals.  " Dispense: 30 Tablet; Refill: 0  - Referral to Orthopedics    Positive MAKAYLA on exam and patient also has a small left femoral head deformity and slightly narrowed joint spaces bilaterally.  Impingement syndrome is a consideration.      Patient started on naproxen twice daily.  Rest and avoid aggravating activities.  Gentle stretching as instructed in clinic.  Follow-up with orthopedics for further evaluation management.  Referral placed.    Differential diagnosis, natural history, supportive care, and indications for immediate follow-up discussed.

## 2023-01-18 NOTE — LETTER
January 18, 2023    To Whom It May Concern:         This is confirmation that Aaron DARA Bill attended his scheduled appointment with Cory Adams P.A.-C. on 1/18/23.  Please excuse him from work on 1/18 through 1/20/2023.         If you have any questions please do not hesitate to call me at the phone number listed below.    Sincerely,          Cory Adams P.A.-C.  [phone]

## 2023-10-27 ENCOUNTER — TELEPHONE (OUTPATIENT)
Dept: HEALTH INFORMATION MANAGEMENT | Facility: OTHER | Age: 43
End: 2023-10-27
Payer: COMMERCIAL

## 2023-10-27 ENCOUNTER — DOCUMENTATION (OUTPATIENT)
Dept: HEALTH INFORMATION MANAGEMENT | Facility: OTHER | Age: 43
End: 2023-10-27
Payer: COMMERCIAL

## 2024-06-14 ENCOUNTER — OFFICE VISIT (OUTPATIENT)
Dept: URGENT CARE | Facility: PHYSICIAN GROUP | Age: 44
End: 2024-06-14
Payer: COMMERCIAL

## 2024-06-14 VITALS
DIASTOLIC BLOOD PRESSURE: 70 MMHG | WEIGHT: 255.8 LBS | SYSTOLIC BLOOD PRESSURE: 110 MMHG | HEIGHT: 69 IN | BODY MASS INDEX: 37.89 KG/M2 | RESPIRATION RATE: 18 BRPM | OXYGEN SATURATION: 96 % | TEMPERATURE: 97.8 F | HEART RATE: 81 BPM

## 2024-06-14 DIAGNOSIS — U07.1 COVID-19: ICD-10-CM

## 2024-06-14 DIAGNOSIS — U07.1 POSITIVE SELF-ADMINISTERED ANTIGEN TEST FOR COVID-19: ICD-10-CM

## 2024-06-14 LAB
FLUAV RNA SPEC QL NAA+PROBE: NEGATIVE
FLUBV RNA SPEC QL NAA+PROBE: NEGATIVE
RSV RNA SPEC QL NAA+PROBE: NEGATIVE
SARS-COV-2 RNA RESP QL NAA+PROBE: POSITIVE

## 2024-06-14 PROCEDURE — 99213 OFFICE O/P EST LOW 20 MIN: CPT

## 2024-06-14 PROCEDURE — 3074F SYST BP LT 130 MM HG: CPT

## 2024-06-14 PROCEDURE — 3078F DIAST BP <80 MM HG: CPT

## 2024-06-14 PROCEDURE — 0241U POCT CEPHEID COV-2, FLU A/B, RSV - PCR: CPT

## 2024-06-14 ASSESSMENT — ENCOUNTER SYMPTOMS
SORE THROAT: 1
MYALGIAS: 0
SINUS PAIN: 1
CHILLS: 0
COUGH: 0
FEVER: 0

## 2024-06-14 ASSESSMENT — FIBROSIS 4 INDEX: FIB4 SCORE: 0.62

## 2024-06-14 NOTE — PROGRESS NOTES
Subjective:     CHIEF COMPLAINT  Chief Complaint   Patient presents with    Coronavirus Screening     Took a at home Covid test cos pos, Pts wants another Covid test        HPI  Aaron Khan is a very pleasant 43 y.o. male who presents after testing positive for COVID on an at-home antigen test this morning.  He reports that his symptoms started 2 nights ago with a sore throat, dry sinuses, and difficulty sleeping.  He would like a test to confirm his at-home positive.  He has a history of hypertension and is interested in taking Paxlovid.    REVIEW OF SYSTEMS  Review of Systems   Constitutional:  Negative for chills and fever.   HENT:  Positive for sinus pain and sore throat.    Respiratory:  Negative for cough.    Musculoskeletal:  Negative for myalgias.       PAST MEDICAL HISTORY  Patient Active Problem List    Diagnosis Date Noted    Hypertension 10/11/2022    BMI 35.0-35.9,adult 10/11/2022    Vitamin D deficiency 10/11/2022    BPV (benign positional vertigo) 10/11/2022    Atypical mole 10/11/2022       SURGICAL HISTORY  patient denies any surgical history    ALLERGIES  No Known Allergies    CURRENT MEDICATIONS  Home Medications       Reviewed by Zandra Ramirez P.A.-C. (Physician Assistant) on 06/14/24 at 1027  Med List Status: <None>     Medication Last Dose Status   lisinopril (PRINIVIL) 10 MG Tab Not Taking Active   naproxen (NAPROSYN) 500 MG Tab Not Taking Active                    SOCIAL HISTORY  Social History     Tobacco Use    Smoking status: Never    Smokeless tobacco: Never   Vaping Use    Vaping status: Never Used   Substance and Sexual Activity    Alcohol use: Yes     Alcohol/week: 3.6 oz     Types: 5 Cans of beer, 1 Shots of liquor per week     Comment: X 1 week    Drug use: Not Currently     Frequency: 1.0 times per week     Types: Marijuana, Inhaled    Sexual activity: Not on file       FAMILY HISTORY  History reviewed. No pertinent family history.       Objective:     VITAL SIGNS: BP  "110/70 (BP Location: Right arm, Patient Position: Sitting, BP Cuff Size: Large adult)   Pulse 81   Temp 36.6 °C (97.8 °F)   Resp 18   Ht 1.753 m (5' 9\")   Wt 116 kg (255 lb 12.8 oz)   SpO2 96%   BMI 37.78 kg/m²     PHYSICAL EXAM  Physical Exam  Vitals reviewed.   Constitutional:       General: He is not in acute distress.     Appearance: Normal appearance. He is not ill-appearing or toxic-appearing.   HENT:      Head: Normocephalic and atraumatic.      Mouth/Throat:      Mouth: Mucous membranes are moist.      Pharynx: Posterior oropharyngeal erythema present. No oropharyngeal exudate.   Eyes:      Conjunctiva/sclera: Conjunctivae normal.      Pupils: Pupils are equal, round, and reactive to light.   Cardiovascular:      Rate and Rhythm: Normal rate and regular rhythm.      Heart sounds: Normal heart sounds.   Pulmonary:      Effort: Pulmonary effort is normal. No respiratory distress.      Breath sounds: Normal breath sounds. No stridor. No wheezing, rhonchi or rales.   Skin:     General: Skin is warm and dry.   Neurological:      General: No focal deficit present.      Mental Status: He is alert and oriented to person, place, and time.   Psychiatric:         Mood and Affect: Mood normal.         Assessment/Plan:     1. Positive self-administered antigen test for COVID-19  - POCT CoV-2, Flu A/B, RSV by PCR  - Nirmatrelvir&Ritonavir 300/100 20 x 150 MG & 10 x 100MG Tablet Therapy Pack; Take 300 mg nirmatrelvir (two 150 mg tablets) with 100 mg ritonavir (one 100 mg tablet) by mouth, with all three tablets taken together twice daily for 5 days.  Dispense: 30 Each; Refill: 0    2. COVID-19  - POCT CoV-2, Flu A/B, RSV by PCR  - Nirmatrelvir&Ritonavir 300/100 20 x 150 MG & 10 x 100MG Tablet Therapy Pack; Take 300 mg nirmatrelvir (two 150 mg tablets) with 100 mg ritonavir (one 100 mg tablet) by mouth, with all three tablets taken together twice daily for 5 days.  Dispense: 30 Each; Refill: 0  -Follow CDC isolation " guidelines  -Tylenol over-the-counter as needed for discomfort  -Rest and hydrate  -Return to clinic if symptoms worsen or fail to resolve    MDM/Comments:  Patient has a history of hypertension managed with lisinopril. Patient has stable vital signs and is non-toxic appearing.  Viral testing for COVID, influenza, and RSV performed in office with positive COVID results.  Prescription for Paxlovid sent to patient's pharmacy.  Discussed risks and benefits of medication.  CDC isolation guidelines discussed.  Discussed supportive care with hydration, rest, Tylenol as needed. Patient demonstrated understanding of treatment plan at this time and will RTC if symptoms worsen or fail to resolve.       Differential diagnosis, natural history, supportive care, and indications for immediate follow-up discussed. All questions answered. Patient agrees with the plan of care.    Follow-up as needed if symptoms worsen or fail to improve to PCP, Urgent care or Emergency Room.    I have personally reviewed prior external notes and test results pertinent to today's visit.  I have independently reviewed and interpreted all diagnostics ordered during this urgent care acute visit.   Discussed management options (risks,benefits, and alternatives to treatment). Pt expresses understanding and the treatment plan was agreed upon. Questions were encouraged and answered to pt's satisfaction.    Please note that this dictation was created using voice recognition software. I have made a reasonable attempt to correct obvious errors, but I expect that there are errors of grammar and possibly content that I did not discover before finalizing the note.

## 2025-03-15 ENCOUNTER — OFFICE VISIT (OUTPATIENT)
Dept: URGENT CARE | Facility: PHYSICIAN GROUP | Age: 45
End: 2025-03-15
Payer: COMMERCIAL

## 2025-03-15 VITALS
HEART RATE: 101 BPM | BODY MASS INDEX: 37.03 KG/M2 | RESPIRATION RATE: 18 BRPM | DIASTOLIC BLOOD PRESSURE: 88 MMHG | TEMPERATURE: 97.1 F | OXYGEN SATURATION: 94 % | HEIGHT: 69 IN | WEIGHT: 250 LBS | SYSTOLIC BLOOD PRESSURE: 144 MMHG

## 2025-03-15 DIAGNOSIS — S61.211A LACERATION OF LEFT INDEX FINGER WITHOUT FOREIGN BODY WITHOUT DAMAGE TO NAIL, INITIAL ENCOUNTER: ICD-10-CM

## 2025-03-15 PROCEDURE — 3079F DIAST BP 80-89 MM HG: CPT | Performed by: NURSE PRACTITIONER

## 2025-03-15 PROCEDURE — 12001 RPR S/N/AX/GEN/TRNK 2.5CM/<: CPT | Performed by: NURSE PRACTITIONER

## 2025-03-15 PROCEDURE — 3077F SYST BP >= 140 MM HG: CPT | Performed by: NURSE PRACTITIONER

## 2025-03-15 RX ORDER — CEPHALEXIN 500 MG/1
500 CAPSULE ORAL 4 TIMES DAILY
Qty: 28 CAPSULE | Refills: 0 | Status: SHIPPED | OUTPATIENT
Start: 2025-03-15 | End: 2025-03-22

## 2025-03-15 ASSESSMENT — VISUAL ACUITY: OU: 1

## 2025-03-15 ASSESSMENT — ENCOUNTER SYMPTOMS
NEUROLOGICAL NEGATIVE: 1
MUSCULOSKELETAL NEGATIVE: 1
WEAKNESS: 0
SENSORY CHANGE: 0

## 2025-03-15 NOTE — PROGRESS NOTES
"Subjective:     Aaron Khan is a 44 y.o. male who presents for Laceration (Left index finger cut with chainsaw onset today )       Laceration   His tetanus status is UTD.     Wife present.    About 1 hour ago, patient reports chainsaw slipped causing cut at top of his left index finger. Not work related.    Maintains range of motion, strength, and sensation. Has mild pain, but denies tenderness. Bleeding controlled with towel.    Tdap received 10/11/2022 per chart review.    Review of Systems   Musculoskeletal: Negative.    Neurological: Negative.  Negative for sensory change and weakness.   All other systems reviewed and are negative.    Refer to HPI for additional details.    During this visit, appropriate PPE was worn, and hand hygiene was performed.    PMH:  has no past medical history on file.    MEDS:   Current Outpatient Medications:     cephALEXin (KEFLEX) 500 MG Cap, Take 1 Capsule by mouth 4 times a day for 7 days., Disp: 28 Capsule, Rfl: 0    ALLERGIES: No Known Allergies  SURGHX: History reviewed. No pertinent surgical history.  SOCHX:  reports that he has never smoked. He has never used smokeless tobacco. He reports current alcohol use of about 3.6 oz of alcohol per week. He reports that he does not currently use drugs after having used the following drugs: Marijuana and Inhaled. Frequency: 1.00 time per week.    FH: Per HPI as applicable/pertinent.      Objective:     BP (!) 144/88 (BP Location: Right arm, Patient Position: Sitting, BP Cuff Size: Adult)   Pulse (!) 101   Temp 36.2 °C (97.1 °F) (Temporal)   Resp 18   Ht 1.753 m (5' 9\")   Wt 113 kg (250 lb)   SpO2 94%   BMI 36.92 kg/m²     Physical Exam  Nursing note reviewed.   Constitutional:       General: He is not in acute distress.     Appearance: He is well-developed. He is not ill-appearing or toxic-appearing.   Eyes:      General: Vision grossly intact.   Cardiovascular:      Rate and Rhythm: Normal rate.   Pulmonary:      Effort: " Pulmonary effort is normal. No respiratory distress.   Musculoskeletal:         General: No deformity. Normal range of motion.      Left hand: Laceration present. No swelling, deformity or bony tenderness. Normal range of motion. Normal strength. Normal sensation. Normal capillary refill.        Hands:       Comments: Approx 2.5 cm full thickness laceration at dorsal left index finger, with irregular edges, with narrow dermal flap attached distally, no visible tendon or bone; distal NVI   Skin:     General: Skin is warm and dry.      Coloration: Skin is not pale.   Neurological:      Mental Status: He is alert and oriented to person, place, and time.      Motor: No weakness.   Psychiatric:         Behavior: Behavior normal. Behavior is cooperative.       Assessment/Plan:     1. Laceration of left index finger without foreign body without damage to nail, initial encounter  - cephALEXin (KEFLEX) 500 MG Cap; Take 1 Capsule by mouth 4 times a day for 7 days.  Dispense: 28 Capsule; Refill: 0  - Referral to Wound Clinic    Procedure: Laceration Repair of Left Index Finger  - Risks, benefits, methods, and alternatives of primary wound closure reviewed.  - Verbal consent received from patient to proceed with laceration repair with sutures.  - Wound length 2.5 cm full thickness laceration at dorsal left index finger, with irregular edges, with narrow dermal flap attached distally, no visible tendon or bone; distal NVI.  - Area copiously irrigated throughout wound and under skin flap with NS; no foreign bodies identified.  - Area soaked and cleansed with diluted chlorhexidine solution.  - Clean technique with sterile instruments.  - Local anesthesia achieved with 5 mL of 1% lidocaine without epinephrine.  - Replaced skin flap.  - Applied #6 interrupted sutures with 4.0 Ethilon.  - Irrigated copiously with NS.  - Good hemostasis achieved with direct pressure.  - Dressing applied and splint applied.  - There were no procedural  complications.  - Patient tolerated procedure well.  - Tetanus UTD.    Advised basic wound care.  Monitor for signs/symptoms of infection.  Prophylactic antibiotics sent electronically to pharmacy.  Suture removal in about 7 to 10 days.  Advised that if skin flap does not remain viable, then wound may require debridement.  Recommend follow-up with wound care clinic.  Referral placed.    Monitor. Warning signs reviewed. Immediate return precautions advised.     Differential diagnosis, natural history, supportive care, over-the-counter symptom management per 's instructions, close monitoring, and indications for immediate follow-up discussed.     All questions answered. Patient agrees with the plan of care.    Discharge summary provided via [employer].

## 2025-03-15 NOTE — LETTER
March 15, 2025         Patient: Aaron Khan   YOB: 1980   Date of Visit: 3/15/2025           To Whom it May Concern:    Aaron Khan was seen in my clinic on 3/15/2025. Patient may return to work.    If you have any questions or concerns, please don't hesitate to call.        Sincerely,         RAMÍREZ Doe.  Electronically Signed

## 2025-03-17 NOTE — Clinical Note
REFERRAL APPROVAL NOTICE         Sent on March 17, 2025                   Aaron Khan  [address]  [place] [place] [address]                   Dear Mr. Khan,    After a careful review of the medical information and benefit coverage, [facility] has processed your referral. See below for additional details.    If applicable, you must be actively enrolled with your insurance for coverage of the authorized service. If you have any questions regarding your coverage, please contact your insurance directly.    REFERRAL INFORMATION   Referral #:  [id number]  Referred-To Department    Referred-By Provider:  Wound Care    SHIRIN Doe   [facility]      [address]  [address]  [facility] [place] [id number] [phone] [address]  [facility] [id number] [phone]    Referral Start Date:  03/15/2025  Referral End Date:   03/15/2026             SCHEDULING  If you do not already have an appointment, please call [phone] to make an appointment.     MORE INFORMATION  If you do not already have a [email or web address] account, sign up at: [email or web address]  You can access your medical information, make appointments, see lab results, billing information, and more.  If you have questions regarding this referral, please contact  the [facility] Referrals department at:             [phone]. Monday - Friday 8:00AM - 5:00PM.     Sincerely,    [facility]

## 2025-03-26 ENCOUNTER — OFFICE VISIT (OUTPATIENT)
Dept: URGENT CARE | Facility: PHYSICIAN GROUP | Age: 45
End: 2025-03-26
Payer: COMMERCIAL

## 2025-03-26 ENCOUNTER — HOSPITAL ENCOUNTER (OUTPATIENT)
Facility: MEDICAL CENTER | Age: 45
End: 2025-03-26
Attending: NURSE PRACTITIONER
Payer: COMMERCIAL

## 2025-03-26 VITALS
WEIGHT: 252.2 LBS | DIASTOLIC BLOOD PRESSURE: 80 MMHG | OXYGEN SATURATION: 97 % | HEART RATE: 84 BPM | HEIGHT: 69 IN | RESPIRATION RATE: 16 BRPM | TEMPERATURE: 97.7 F | SYSTOLIC BLOOD PRESSURE: 110 MMHG | BODY MASS INDEX: 37.36 KG/M2

## 2025-03-26 DIAGNOSIS — L03.012 CELLULITIS OF LEFT INDEX FINGER: ICD-10-CM

## 2025-03-26 DIAGNOSIS — S61.211A LACERATION OF LEFT INDEX FINGER WITHOUT FOREIGN BODY WITHOUT DAMAGE TO NAIL, INITIAL ENCOUNTER: ICD-10-CM

## 2025-03-26 PROCEDURE — 87205 SMEAR GRAM STAIN: CPT

## 2025-03-26 PROCEDURE — 3074F SYST BP LT 130 MM HG: CPT | Performed by: NURSE PRACTITIONER

## 2025-03-26 PROCEDURE — 3079F DIAST BP 80-89 MM HG: CPT | Performed by: NURSE PRACTITIONER

## 2025-03-26 PROCEDURE — 87070 CULTURE OTHR SPECIMN AEROBIC: CPT

## 2025-03-26 PROCEDURE — 99213 OFFICE O/P EST LOW 20 MIN: CPT | Performed by: NURSE PRACTITIONER

## 2025-03-26 RX ORDER — CLINDAMYCIN HYDROCHLORIDE 300 MG/1
300 CAPSULE ORAL 3 TIMES DAILY
Qty: 21 CAPSULE | Refills: 0 | Status: SHIPPED | OUTPATIENT
Start: 2025-03-26 | End: 2025-04-02

## 2025-03-26 ASSESSMENT — ENCOUNTER SYMPTOMS
ROS SKIN COMMENTS: PER HPI
FEVER: 0

## 2025-03-26 ASSESSMENT — VISUAL ACUITY: OU: 1

## 2025-03-26 NOTE — PROGRESS NOTES
Subjective:     Aaron Khan is a 44 y.o. male who presents for Suture / Staple Removal (L index, x10 days ago. )       Suture / Staple Removal  He tried oral antibiotics and regular soap and water washings since the wound repair. There has been colored discharge from the wound.     Patient seen in  3/15/2025 after suffering laceration to left index finger by a chainsaw.  Laceration repair performed with 6 interrupted sutures.  Patient was started on a 7-day course of cephalexin which patient completed.    Patient was referred to wound care clinic for evaluation of laceration, suture removal, and possible debridement of nonviable tissue.  However, referral was declined as patient had sutures in and advised to re-refer.    At this time, patient still has 6 interrupted sutures in place. Has been performing basic wound care with mild soapy water and dressing changes once daily. Reports bleeding continued for the first couple of days after his initial visit before stopping. Reports 2 days ago, expressed a small amount of fluid that appeared to be pus from his wound. Reports decreased range of motion of the finger. This is likely related to finger swelling which he reports has spanned up to his hand. There is associated mild redness. Denies fever or other symptoms.    Review of Systems   Constitutional:  Negative for fever.   Skin:         Per HPI   All other systems reviewed and are negative.    Refer to HPI for additional details.    During this visit, appropriate PPE was worn, and hand hygiene was performed.    PMH:  has no past medical history on file.    MEDS:   Current Outpatient Medications:     clindamycin (CLEOCIN) 300 MG Cap, Take 1 Capsule by mouth 3 times a day for 7 days., Disp: 21 Capsule, Rfl: 0    ALLERGIES: No Known Allergies  SURGHX: History reviewed. No pertinent surgical history.  SOCHX:  reports that he has never smoked. He has never used smokeless tobacco. He reports current alcohol use of  "about 3.6 oz of alcohol per week. He reports that he does not currently use drugs after having used the following drugs: Marijuana and Inhaled. Frequency: 1.00 time per week.    FH: Per HPI as applicable/pertinent.      Objective:     /80 (BP Location: Right arm, Patient Position: Sitting, BP Cuff Size: Large adult)   Pulse 84   Temp 36.5 °C (97.7 °F) (Temporal)   Resp 16   Ht 1.753 m (5' 9\")   Wt 114 kg (252 lb 3.2 oz)   SpO2 97%   BMI 37.24 kg/m²     Physical Exam  Nursing note reviewed.   Constitutional:       General: He is not in acute distress.     Appearance: He is well-developed. He is not ill-appearing or toxic-appearing.   Eyes:      General: Vision grossly intact.   Cardiovascular:      Rate and Rhythm: Normal rate.   Pulmonary:      Effort: Pulmonary effort is normal. No respiratory distress.   Musculoskeletal:         General: No deformity.      Left hand: Swelling, laceration and tenderness present. No deformity. Decreased range of motion. Normal strength. Normal sensation. Normal capillary refill.      Comments: Approx 2.5 cm laceration at dorsal left index finger, 6 interrupted sutures in place, wound edges approximated, previous flap with purplish/black discoloration, scant yellowish fluid and bleeding present, no fluctuance, mild TTP with decreased sensation to sharp stimuli; mild swelling of left index finger present with mild erythema; decreased ROM of finger due to swelling; distal NVI   Skin:     General: Skin is warm and dry.      Coloration: Skin is not pale.   Neurological:      Mental Status: He is alert and oriented to person, place, and time.      Motor: No weakness.   Psychiatric:         Behavior: Behavior normal. Behavior is cooperative.       Assessment/Plan:     1. Cellulitis of left index finger  - CULTURE WOUND W/ GRAM STAIN; Future  - clindamycin (CLEOCIN) 300 MG Cap; Take 1 Capsule by mouth 3 times a day for 7 days.  Dispense: 21 Capsule; Refill: 0  - Referral to " Wound Clinic    2. Laceration of left index finger without foreign body without damage to nail, initial encounter  - CULTURE WOUND W/ GRAM STAIN; Future  - clindamycin (CLEOCIN) 300 MG Cap; Take 1 Capsule by mouth 3 times a day for 7 days.  Dispense: 21 Capsule; Refill: 0  - Referral to Wound Clinic    6 sutures removed. Wound culture pending. Rx as above sent electronically.    Advised to continue with basic wound care.    Follow up with wound care. New referral placed.    Differential diagnosis, natural history, supportive care, over-the-counter symptom management per 's instructions, close monitoring, and indications for immediate follow-up discussed.     All questions answered. Patient agrees with the plan of care.    Discharge summary provided via [email or web address].

## 2025-03-27 LAB
GRAM STN SPEC: NORMAL
SIGNIFICANT IND 70042: NORMAL
SITE SITE: NORMAL
SOURCE SOURCE: NORMAL

## 2025-03-27 NOTE — Clinical Note
REFERRAL APPROVAL NOTICE         Sent on March 27, 2025                   Aaron Khan  [address]  [facility] [place] [address]                   Dear Mr. Khan,    After a careful review of the medical information and benefit coverage, [facility] has processed your referral. See below for additional details.    If applicable, you must be actively enrolled with your insurance for coverage of the authorized service. If you have any questions regarding your coverage, please contact your insurance directly.    REFERRAL INFORMATION   Referral #:  [id number]  Referred-To Department    Referred-By Provider:  Wound Care    SHIRIN Doe   [facility]      [address]  [address]  [facility] [id number] [phone] [address]  [facility] [id number] [phone]    Referral Start Date:  03/26/2025  Referral End Date:   03/26/2026             SCHEDULING  If you do not already have an appointment, please call [phone] to make an appointment.     MORE INFORMATION  If you do not already have a [employer] account, sign up at: [email or web address]  You can access your medical information, make appointments, see lab results, billing information, and more.  If you have questions regarding this referral, please contact  the [facility] Referrals department at:             [phone]. Monday - Friday 8:00AM - 5:00PM.     Sincerely,    [facility]

## 2025-03-29 LAB
BACTERIA WND AEROBE CULT: NORMAL
GRAM STN SPEC: NORMAL
SIGNIFICANT IND 70042: NORMAL
SITE SITE: NORMAL
SOURCE SOURCE: NORMAL

## 2025-03-30 ENCOUNTER — RESULTS FOLLOW-UP (OUTPATIENT)
Dept: URGENT CARE | Facility: PHYSICIAN GROUP | Age: 45
End: 2025-03-30

## 2025-03-31 ENCOUNTER — NON-PROVIDER VISIT (OUTPATIENT)
Dept: WOUND CARE | Facility: MEDICAL CENTER | Age: 45
End: 2025-03-31
Attending: NURSE PRACTITIONER
Payer: COMMERCIAL

## 2025-03-31 DIAGNOSIS — S61.219D COMPLICATED LACERATION OF FINGER, SUBSEQUENT ENCOUNTER: ICD-10-CM

## 2025-03-31 PROCEDURE — 99212 OFFICE O/P EST SF 10 MIN: CPT

## 2025-03-31 PROCEDURE — 97597 DBRDMT OPN WND 1ST 20 CM/<: CPT

## 2025-03-31 NOTE — PATIENT INSTRUCTIONS
- Keep dressings clean and dry. Change dressings every 3-4 days, and if they become over saturated, soiled or fall off.     - If you need to change your dressings at home: Wash your wound with normal saline, wound cleanser, or unscented soap and water prior to applying your new dressings. Please avoid cleansing with hydrogen peroxide or rubbing alcohol. Hydrogen peroxide and rubbing alcohol are toxic to new tissue and skin cells.    - Should you experience any significant changes in your wound(s), such as infection (redness, swelling, localized heat, increased pain, fever > 101 F, chills) or have any questions regarding your home care instructions, please contact the wound center at [phone]. If after hours, contact your primary care physician or go to the hospital emergency room.     - If you are admitted to any hospital, you will need a new referral to come back to the wound clinic and any scheduled appointments that you already have, may be cancelled.    - If you are 5 or more minutes late for an appointment, we reserve the right to cancel and reschedule that appointment. Additionally, if you are habitually late or not showing (3 late cancellations and/or no shows), we reserve the right to cancel your remaining appointments and it will be your responsibility to obtain a new referral if services are still needed.

## 2025-03-31 NOTE — CERTIFICATION
Non Provider Encounter - Full Thickness Wound      HISTORY OF PRESENT ILLNESS    Wound History:   START OF CARE IN CLINIC: 03/31/25    REFERRING PROVIDER: Filiberto Andres A.P.R.N.   WOUND: Full Thickness Wound   LOCATION: Left dorsal 2nd finger     Pertinent Medical History:   - HOW / WHEN injury first occurred: Patient initially presented to Urgent care on 03/15/25 when he accidentally lacerated his left index finger with a the chainsaw when it slipped. Had 6 sutures placed to approximate the wound and was prescribed a 7 day course of Keflex (Completed full course). He returned to urgent care on 03/26/25 to have sutures removed and was prescribed a 7 day course of Clindamycin (did not take). Referred to [facility] wound care clinic to assess wound.   - Current/home treatment: Patient has been applying band-aids BID and cleansing with antiseptic cleanser with every dressing change.     Labs:   A1c:   Lab Results   Component Value Date/Time    HBA1C 5.6 10/19/2022 08:36 AM        TOBACCO USE:   Tobacco Use: Low Risk  (3/26/2025)    Patient History     Smoking Tobacco Use: Never     Smokeless Tobacco Use: Never     Passive Exposure: Not on file     Wound Cx:   Lab Results   Component Value Date/Time    CULTRSULT No growth at 72 hours. 03/26/2025 05:18 PM       Patient's problem list, allergies, and current medications reviewed and updated in Epic. Please see medical record for details.     WOUND ASSESSMENT     Wound 03/31/25 Finger, 2nd Dorsal Left (Active)   Wound Image   03/31/25 1400   Site Assessment Pink;Red;Yellow;Slough;Brown 03/31/25 1400   Periwound Assessment Edema 03/31/25 1400   Margins Unattached edges 03/31/25 1400   Closure Secondary intention 03/31/25 1400   Drainage Amount Small 03/31/25 1400   Drainage Description Serosanguineous 03/31/25 1400   Treatments Cleansed;Topical Lidocaine;CSWD - Conservative Sharp Wound Debridement;Site care 03/31/25 1400   Wound Cleansing Hypochlorus Acid 03/31/25  "1400   Periwound Protectant Skin Protectant Wipes to Periwound 03/31/25 1400   Dressing Changed New 03/31/25 1400   Dressing Cleansing/Solutions Not Applicable 03/31/25 1400   Dressing Options Honey Colloid;Hypafix Tape;Carol;Tubigrip (1\" Carol lymphedema wrap to finger, Tensogrip C with thumb cut out). 03/31/25 1400   Dressing Change/Treatment Frequency Every 48 hrs, and As Needed 03/31/25 1400   Wound Team Following Weekly 03/31/25 1400   Post-Procedure Length (cm) 0.6 cm 03/31/25 1400   Post-Procedure Width (cm) 1.6 cm 03/31/25 1400   Post-Procedure Depth (cm) 0.2 cm 03/31/25 1400   Post-Procedure Surface Area (cm^2) 0.96 cm^2 03/31/25 1400   Post-Procedure Volume (cm^3) 0.192 cm^3 03/31/25 1400   Tunneling (cm) 0 cm 03/31/25 1400   Undermining (cm) 0 cm 03/31/25 1400   Wound Odor None 03/31/25 1400   Exposed Structures None 03/31/25 1400          PROCEDURES    2% viscous lidocaine applied topically to wound bed for approximately 5 minutes prior to debridement. Conservative sharp wound debridement with curette, scissors and forceps to debride wound bed and periwound of non-viable tissue. Entire surface of wound, < 20 cm2 debrided. Refer to flow sheet for wound care details. Patient tolerated the procedure well.    PATIENT EDUCATION  - Patient educated on wound clinic goals for care, moist wound healing, dressing selection, and how to apply dressing  - Educated on s/s of infection and when to seek emergency medical attention  - Order for wound supplies placed: No   - Requested next visit on provider: Yes    - PLAN for next visit: Re-assess wound bed and need for honey colloid dressing. Order supplies if honey no longer needed.     - Educated on importance of adequate nutrition for wound healing  - Increase protein intake (unless contraindicated by renal status)   "

## 2025-03-31 NOTE — PROGRESS NOTES
Asked to see left dorsal finger wound by RN for concerns of decreased range of motion to finger.  Patient presents for his initial evaluation.  He reports he was in a finger splint for approximately 1 week from date of injury on 3/15/2025.    No tendon noted during visual inspection of wound, scant necrotic tissue present.  He does have increased edema with limited range of motion with flexion to PIP and DIP as well as limited extension slightly to DIP.  Please see RN note for further detail.      Plan  - Initiate honey dressing for autolytic debridement  - Initiate mild compression to finger and to hand with Carol wrap and Tubigrip  - If ulcer worsens, recommend x-ray, referral to Ortho hand, and/or OT    My total time spent caring for the patient on the day of the encounter was 10 minutes, reviewing history, assessment, counseling and education, and coordination of care.  This does not include time spent on separately billable procedures/tests.

## 2025-04-09 ENCOUNTER — APPOINTMENT (OUTPATIENT)
Dept: WOUND CARE | Facility: MEDICAL CENTER | Age: 45
End: 2025-04-09
Attending: NURSE PRACTITIONER
Payer: COMMERCIAL

## 2025-04-14 ENCOUNTER — APPOINTMENT (OUTPATIENT)
Dept: WOUND CARE | Facility: MEDICAL CENTER | Age: 45
End: 2025-04-14
Attending: NURSE PRACTITIONER
Payer: COMMERCIAL

## 2025-04-15 ENCOUNTER — OFFICE VISIT (OUTPATIENT)
Dept: WOUND CARE | Facility: MEDICAL CENTER | Age: 45
End: 2025-04-15
Attending: NURSE PRACTITIONER
Payer: COMMERCIAL

## 2025-04-15 DIAGNOSIS — S61.211D LACERATION OF LEFT INDEX FINGER WITHOUT FOREIGN BODY WITHOUT DAMAGE TO NAIL, SUBSEQUENT ENCOUNTER: ICD-10-CM

## 2025-04-15 PROCEDURE — 99214 OFFICE O/P EST MOD 30 MIN: CPT

## 2025-04-15 PROCEDURE — 99214 OFFICE O/P EST MOD 30 MIN: CPT | Performed by: STUDENT IN AN ORGANIZED HEALTH CARE EDUCATION/TRAINING PROGRAM

## 2025-04-15 PROCEDURE — 99213 OFFICE O/P EST LOW 20 MIN: CPT

## 2025-04-15 ASSESSMENT — ENCOUNTER SYMPTOMS
FEVER: 0
CHILLS: 0

## 2025-04-15 NOTE — CERTIFICATION
[facility]   [facility]   [address]   [address]   [place] [address]   [phone] Fax: [phone]    Discharge Note      Referring Physician: Filiberto Andres A.P.R.N.  Wound Etiology: Laceration of left index finger without foreign body without damage to nail, initial encounter   Wound location: Left dorsal index finger  ICD-10: S61.211A   Date of Discharge: 04/15/25    Assessment:  Discharge patient at this time secondary to wound resolution.            Thank you for the referral and the opportunity to treat your patient.

## 2025-04-15 NOTE — PROGRESS NOTES
Provider Encounter- Full Thickness wound    HISTORY OF PRESENT ILLNESS  Wound History:    START OF CARE IN CLINIC: 3/31/2025    REFERRING PROVIDER: Filiberto Andres      WOUND- Full Thickness Wound   LOCATION: Left dorsal 2nd finger laceration   HISTORY:  44-year-old male past medical history of hypertension, overweight.  Patient was using a chainsaw when he injured left index finger with laceration.  He proceeded to urgent care March 15 where his laceration was closed primarily and there was no concern for underlying structural injury.  Patient was started on Keflex.  He returned to urgent care March 26, sutures were removed and he was referred to [facility] for further wound care.     Pertinent Medical History: HTN, Overweight     TOBACCO USE: Denies use      Patient's problem list, allergies, and current medications reviewed and updated in Epic    Interval History:  4/15/2025: Clinic visit with Domenico Lala MD. Patient reports doing well. He denies any drainage, fevers, chills, or significant pain. He reports he is using hand normally, but does endorse some decreased ROM distal finger, he thinks it is due to him keeping finger strait and was previously in splint. Recommend referral to OT, however he declines and he has friend who is OT.    REVIEW OF SYSTEMS:   Review of Systems   Constitutional:  Negative for chills and fever.   Skin:         Open wound left dorsal finger       PHYSICAL EXAMINATION:   There were no vitals taken for this visit.    Physical Exam  Constitutional:       General: He is not in acute distress.     Appearance: Normal appearance.   Cardiovascular:      Pulses: Normal pulses.   Pulmonary:      Effort: Pulmonary effort is normal. No respiratory distress.   Musculoskeletal:      Comments: Normal ROM passive  Slight decreased ROM of DIP due to edema and nonuse   Skin:     Comments: Left dorsal 2nd finger laceration: Wound near resolution, new epithelium formed. Pinpoint minuscule  superficial wound left. No drainage. No evidence of infection.    Neurological:      Mental Status: He is alert.         WOUND ASSESSMENT           PROCEDURE:   - Removed some crust with forceps and scissors. No excisional debridement performed.       Pertinent Labs and Diagnostics:    Labs:     A1c:   Lab Results   Component Value Date/Time    HBA1C 5.6 10/19/2022 08:36 AM        IMAGING: None    VASCULAR STUDIES: None    LAST  WOUND CULTURE:  DATE :   Lab Results   Component Value Date/Time    CULTRSULT No growth at 72 hours. 03/26/2025 05:18 PM         ASSESSMENT AND PLAN:     1. Laceration of left index finger without foreign body without damage to nail, subsequent encounter    4/15/2025  - Wound has improved dramatically, minuscule small opening.  - Patient feels comfortable managing wound to completion, will be discharged from clinic  - He has slight decrease in ROM, no evidence of tendon injury. Due to decreased use and was in splint for a period of time. Recommend performing hand exercises and recommend referral to OT. Patient declines referral as he has friend who is OT.  - Recommend moisturizing new epithelium to keep supple  - Patient will be discharged from clinic today.      PATIENT EDUCATION  - Importance of adequate nutrition for wound healing  -Advised to go to ER for any increased redness, swelling, drainage, or odor, or if patient develops fever, chills, nausea or vomiting.     My total time spent caring for the patient on the day of the encounter was 30 minutes, reviewing history, assessment, counseling and education, and coordination of care.  This does not include time spent on separately billable procedures/tests.        Please note that this note may have been created using voice recognition software. I have worked with technical experts from [employer] to optimize the interface.  I have made every reasonable attempt to correct obvious errors, but there may be errors of grammar and possibly  content that I did not discover before finalizing the note.    N

## 2025-04-21 ENCOUNTER — APPOINTMENT (OUTPATIENT)
Dept: WOUND CARE | Facility: MEDICAL CENTER | Age: 45
End: 2025-04-21
Attending: NURSE PRACTITIONER
Payer: COMMERCIAL

## 2025-04-28 ENCOUNTER — APPOINTMENT (OUTPATIENT)
Dept: WOUND CARE | Facility: MEDICAL CENTER | Age: 45
End: 2025-04-28
Attending: NURSE PRACTITIONER
Payer: COMMERCIAL

## 2025-06-15 SDOH — ECONOMIC STABILITY: FOOD INSECURITY: WITHIN THE PAST 12 MONTHS, THE FOOD YOU BOUGHT JUST DIDN'T LAST AND YOU DIDN'T HAVE MONEY TO GET MORE.: NEVER TRUE

## 2025-06-15 SDOH — HEALTH STABILITY: PHYSICAL HEALTH: ON AVERAGE, HOW MANY DAYS PER WEEK DO YOU ENGAGE IN MODERATE TO STRENUOUS EXERCISE (LIKE A BRISK WALK)?: 0 DAYS

## 2025-06-15 SDOH — ECONOMIC STABILITY: INCOME INSECURITY: IN THE LAST 12 MONTHS, WAS THERE A TIME WHEN YOU WERE NOT ABLE TO PAY THE MORTGAGE OR RENT ON TIME?: NO

## 2025-06-15 SDOH — ECONOMIC STABILITY: INCOME INSECURITY: HOW HARD IS IT FOR YOU TO PAY FOR THE VERY BASICS LIKE FOOD, HOUSING, MEDICAL CARE, AND HEATING?: NOT HARD AT ALL

## 2025-06-15 SDOH — ECONOMIC STABILITY: FOOD INSECURITY: WITHIN THE PAST 12 MONTHS, YOU WORRIED THAT YOUR FOOD WOULD RUN OUT BEFORE YOU GOT MONEY TO BUY MORE.: NEVER TRUE

## 2025-06-15 SDOH — HEALTH STABILITY: PHYSICAL HEALTH: ON AVERAGE, HOW MANY MINUTES DO YOU ENGAGE IN EXERCISE AT THIS LEVEL?: 0 MIN

## 2025-06-15 SDOH — HEALTH STABILITY: MENTAL HEALTH
STRESS IS WHEN SOMEONE FEELS TENSE, NERVOUS, ANXIOUS, OR CAN'T SLEEP AT NIGHT BECAUSE THEIR MIND IS TROUBLED. HOW STRESSED ARE YOU?: NOT AT ALL

## 2025-06-15 ASSESSMENT — SOCIAL DETERMINANTS OF HEALTH (SDOH)
IN THE PAST 12 MONTHS, HAS THE ELECTRIC, GAS, OIL, OR WATER COMPANY THREATENED TO SHUT OFF SERVICE IN YOUR HOME?: NO
HOW HARD IS IT FOR YOU TO PAY FOR THE VERY BASICS LIKE FOOD, HOUSING, MEDICAL CARE, AND HEATING?: NOT HARD AT ALL
HOW OFTEN DO YOU GET TOGETHER WITH FRIENDS OR RELATIVES?: ONCE A WEEK
IN A TYPICAL WEEK, HOW MANY TIMES DO YOU TALK ON THE PHONE WITH FAMILY, FRIENDS, OR NEIGHBORS?: ONCE A WEEK
HOW OFTEN DO YOU ATTENT MEETINGS OF THE CLUB OR ORGANIZATION YOU BELONG TO?: MORE THAN 4 TIMES PER YEAR
HOW MANY DRINKS CONTAINING ALCOHOL DO YOU HAVE ON A TYPICAL DAY WHEN YOU ARE DRINKING: PATIENT DOES NOT DRINK
DO YOU BELONG TO ANY CLUBS OR ORGANIZATIONS SUCH AS CHURCH GROUPS UNIONS, FRATERNAL OR ATHLETIC GROUPS, OR SCHOOL GROUPS?: YES
HOW OFTEN DO YOU HAVE A DRINK CONTAINING ALCOHOL: NEVER
HOW OFTEN DO YOU ATTEND CHURCH OR RELIGIOUS SERVICES?: MORE THAN 4 TIMES PER YEAR
HOW OFTEN DO YOU GET TOGETHER WITH FRIENDS OR RELATIVES?: ONCE A WEEK
HOW OFTEN DO YOU ATTENT MEETINGS OF THE CLUB OR ORGANIZATION YOU BELONG TO?: MORE THAN 4 TIMES PER YEAR
DO YOU BELONG TO ANY CLUBS OR ORGANIZATIONS SUCH AS CHURCH GROUPS UNIONS, FRATERNAL OR ATHLETIC GROUPS, OR SCHOOL GROUPS?: YES
WITHIN THE PAST 12 MONTHS, YOU WORRIED THAT YOUR FOOD WOULD RUN OUT BEFORE YOU GOT THE MONEY TO BUY MORE: NEVER TRUE
HOW OFTEN DO YOU HAVE SIX OR MORE DRINKS ON ONE OCCASION: NEVER
IN A TYPICAL WEEK, HOW MANY TIMES DO YOU TALK ON THE PHONE WITH FAMILY, FRIENDS, OR NEIGHBORS?: ONCE A WEEK
HOW OFTEN DO YOU ATTEND CHURCH OR RELIGIOUS SERVICES?: MORE THAN 4 TIMES PER YEAR

## 2025-06-15 ASSESSMENT — LIFESTYLE VARIABLES
AUDIT-C TOTAL SCORE: 0
HOW OFTEN DO YOU HAVE SIX OR MORE DRINKS ON ONE OCCASION: NEVER
HOW MANY STANDARD DRINKS CONTAINING ALCOHOL DO YOU HAVE ON A TYPICAL DAY: PATIENT DOES NOT DRINK
HOW OFTEN DO YOU HAVE A DRINK CONTAINING ALCOHOL: NEVER
SKIP TO QUESTIONS 9-10: 1

## 2025-06-16 ENCOUNTER — OFFICE VISIT (OUTPATIENT)
Dept: MEDICAL GROUP | Facility: MEDICAL CENTER | Age: 45
End: 2025-06-16
Payer: COMMERCIAL

## 2025-06-16 VITALS
HEART RATE: 68 BPM | WEIGHT: 235 LBS | HEIGHT: 69 IN | OXYGEN SATURATION: 98 % | SYSTOLIC BLOOD PRESSURE: 106 MMHG | DIASTOLIC BLOOD PRESSURE: 72 MMHG | TEMPERATURE: 97.4 F | RESPIRATION RATE: 12 BRPM | BODY MASS INDEX: 34.8 KG/M2

## 2025-06-16 DIAGNOSIS — Z13.6 SCREENING FOR CARDIOVASCULAR CONDITION: ICD-10-CM

## 2025-06-16 DIAGNOSIS — Z11.4 ENCOUNTER FOR SCREENING FOR HIV: ICD-10-CM

## 2025-06-16 DIAGNOSIS — Z13.29 SCREENING FOR ENDOCRINE, METABOLIC AND IMMUNITY DISORDER: ICD-10-CM

## 2025-06-16 DIAGNOSIS — D22.9 ATYPICAL MOLE: ICD-10-CM

## 2025-06-16 DIAGNOSIS — Z13.0 SCREENING FOR ENDOCRINE, METABOLIC AND IMMUNITY DISORDER: ICD-10-CM

## 2025-06-16 DIAGNOSIS — Z13.228 SCREENING FOR ENDOCRINE, METABOLIC AND IMMUNITY DISORDER: ICD-10-CM

## 2025-06-16 DIAGNOSIS — Z23 NEED FOR VACCINATION: ICD-10-CM

## 2025-06-16 DIAGNOSIS — E66.9 OBESITY (BMI 30-39.9): ICD-10-CM

## 2025-06-16 DIAGNOSIS — Z11.59 NEED FOR HEPATITIS C SCREENING TEST: ICD-10-CM

## 2025-06-16 DIAGNOSIS — Z76.89 ENCOUNTER TO ESTABLISH CARE: Primary | ICD-10-CM

## 2025-06-16 DIAGNOSIS — K40.91 UNILATERAL RECURRENT INGUINAL HERNIA WITHOUT OBSTRUCTION OR GANGRENE: ICD-10-CM

## 2025-06-16 DIAGNOSIS — E55.9 VITAMIN D DEFICIENCY: ICD-10-CM

## 2025-06-16 PROCEDURE — 3078F DIAST BP <80 MM HG: CPT

## 2025-06-16 PROCEDURE — 90746 HEPB VACCINE 3 DOSE ADULT IM: CPT | Mod: JZ

## 2025-06-16 PROCEDURE — 99214 OFFICE O/P EST MOD 30 MIN: CPT | Mod: 25

## 2025-06-16 PROCEDURE — 90471 IMMUNIZATION ADMIN: CPT

## 2025-06-16 PROCEDURE — 3074F SYST BP LT 130 MM HG: CPT

## 2025-06-16 ASSESSMENT — ENCOUNTER SYMPTOMS
SHORTNESS OF BREATH: 0
CHILLS: 0
ORTHOPNEA: 0
FEVER: 0
PALPITATIONS: 0
COUGH: 0

## 2025-06-16 ASSESSMENT — PATIENT HEALTH QUESTIONNAIRE - PHQ9: CLINICAL INTERPRETATION OF PHQ2 SCORE: 0

## 2025-06-16 NOTE — PROGRESS NOTES
Subjective:     Verbal consent was acquired by the patient to use [facility] ambient listening note generation during this visit Yes    CC: Establish Care      HPI:   Aaron is a 44 y.o. male who presents today for:    History of Present Illness  The patient presents for the establishment of care.    Cutaneous Lesion  He was previously referred to a dermatologist for evaluation of a cutaneous lesion but did not pursue the consultation due to insurance-related concerns. He has a mole on the right side of his face that he is concerned about he also has several large moles in his hair that he would like to have evaluated.     Inguinal Hernia  The patient has a history of an appendectomy performed in 2017, during which an inguinal hernia was incidentally discovered. The hernia was not repaired at that time due to concerns regarding potential infection. He now seeks evaluation of the hernia, which has been causing episodic discomfort localized to the lower pelvic region. The hernia is on the right side. He reports no visible protrusion of the hernia through the skin, and the pain is characterized as mild, even at its peak intensity.   - Onset: Discovered incidentally during appendectomy in 2017.  - Location: Lower pelvic region.  - Duration: Episodic discomfort.  - Character: Mild pain, no visible protrusion through the skin.  - Severity: Mild, even at peak intensity.    Preventive Measures and Screenings  The patient is also interested in discussing age-appropriate preventive measures and screenings. He is uncertain about his hepatitis B vaccination status but does not have regular exposure to blood or bodily fluids. He is currently taking a multivitamin supplement.    Medical History  The patient has a medical history significant for episodes of dizziness or vertigo, vitamin D deficiency, and hypertension.    PAST SURGICAL HISTORY:  - Appendectomy (2017)  - Finger debridement procedure    SOCIAL HISTORY  He does not  "smoke or vape.    FAMILY HISTORY  His mother is healthy. His father has had lumbar and cervical fusion due to back injuries and has experienced nerve issues and chronic pain, requiring pain medications for 15 to 20 years. His maternal grandfather recently passed away from cancer, but the specific type is unknown. His maternal grandmother and paternal grandmother are still alive. He is unsure about his paternal grandfather's status.         Allergies: Patient has no known allergies.     Medications: Current Medications[1]      ROS:  Review of Systems   Constitutional:  Negative for chills and fever.   Respiratory:  Negative for cough and shortness of breath.    Cardiovascular:  Negative for chest pain, palpitations, orthopnea and leg swelling.       Objective:     Exam:  /72   Pulse 68   Temp 36.3 °C (97.4 °F) (Temporal)   Resp 12   Ht 1.753 m (5' 9\")   Wt 107 kg (235 lb)   SpO2 98%   BMI 34.70 kg/m²  Body mass index is 34.7 kg/m².    Physical Exam  Constitutional:       Appearance: He is normal weight.   Eyes:      Pupils: Pupils are equal, round, and reactive to light.   Cardiovascular:      Rate and Rhythm: Normal rate and regular rhythm.      Pulses: Normal pulses.      Heart sounds: Normal heart sounds.   Pulmonary:      Effort: Pulmonary effort is normal.      Breath sounds: Normal breath sounds.   Abdominal:      Hernia: A hernia is present.       Neurological:      Mental Status: He is alert and oriented to person, place, and time.   Psychiatric:         Mood and Affect: Mood normal.         Behavior: Behavior normal.           Assessment & Plan:     Aaron a 44 y.o. male with the following -     Assessment & Plan    1. Encounter to establish care  Stable    2. Unilateral recurrent inguinal hernia without obstruction or gangrene  Chronic, stable  History of inguinal hernia identified during appendectomy in 2017, with occasional mild pain but no severe symptoms. Increased pain and limited " mobility.  Diagnostic plan: Ultrasound ordered to confirm current status and ensure hernia is not entrapped. Referral to [facility] for further evaluation and potential treatment.  - Referral to General Surgery  - US-INGUINAL HERNIA; Future    3. Atypical mole  Chronic, stable  History of spot on skin previously referred for dermatological evaluation but not pursued due to insurance issues. Presence of mole on face and multiple moles on back.  Diagnostic plan: Referral to dermatology for comprehensive skin examination.  - Referral to Dermatology    4. Obesity (BMI 30-39.9)  5. BMI 34.0-34.9,adult  Chronic, stable  Encouraged to maintain healthy diet, regular exercise, stress reduction, and active lifestyle.   - HEMOGLOBIN A1C; Future  - TSH WITH REFLEX TO FT4; Future  - Comp Metabolic Panel; Future  - Lipid Profile; Future  - CBC WITHOUT DIFFERENTIAL; Future    6. Vitamin D deficiency  Chronic, stable  History of vitamin D deficiency. He is taking a multivitamin. Will recheck labs.   - VITAMIN D,25 HYDROXY (DEFICIENCY); Future    7. Screening for endocrine, metabolic and immunity disorder  - HEMOGLOBIN A1C; Future  - TSH WITH REFLEX TO FT4; Future  - Comp Metabolic Panel; Future  - CBC WITHOUT DIFFERENTIAL; Future    8. Screening for cardiovascular condition  - Lipid Profile; Future    9. Encounter for screening for HIV  - HIV AG/AB COMBO ASSAY SCREENING; Future    10. Need for hepatitis C screening test  - HEP C VIRUS ANTIBODY; Future    11. Need for vaccination  - Hepatitis B Vaccine Adult IM        Anticipatory guidance included the following: Patient counseled about skin care, diet, supplements, smoking, drugs/alcohol use, safe sex and exercise.     Return in about 1 year (around 6/16/2026), or if symptoms worsen or fail to improve.    Please note that this dictation was created using voice recognition software. I have made every reasonable attempt to correct obvious errors, but I expect that there  are errors of grammar and possibly content that I did not discover before finalizing the note.      I have placed the below orders and discussed them with an approved delegating provider.  The MA is performing the below orders under the direction of Dr. Del Real.         [1]   Current Outpatient Medications:     multivitamin Tab, Take 1 Tablet by mouth every day., Disp: , Rfl:

## 2025-06-20 NOTE — Clinical Note
REFERRAL APPROVAL NOTICE         Sent on June 20, 2025                   Aaron Khan  [address]  [place] [address]                   Dear Mr. Khan,    After a careful review of the medical information and benefit coverage, [facility] has processed your referral. See below for additional details.    If applicable, you must be actively enrolled with your insurance for coverage of the authorized service. If you have any questions regarding your coverage, please contact your insurance directly.    REFERRAL INFORMATION   Referral #:  [id number]  Referred-To Provider    Referred-By Provider:  Surgery    SHIRIN Serrano   [facility]      [address]  [place] [address]  [phone] [address] [facility] [address]  [place] [address]  [phone]    Referral Start Date:  06/16/2025  Referral End Date:   06/16/2026             SCHEDULING  If you do not already have an appointment, please call [phone] to make an appointment.     MORE INFORMATION  If you do not already have a [employer] account, sign up at: [email or web address]  You can access your medical information, make appointments, see lab results, billing information, and more.  If you have questions regarding this referral, please contact  the [facility] Referrals department at:             [phone]. Monday - Friday 8:00AM - 5:00PM.     Sincerely,    [facility]

## 2025-06-20 NOTE — Clinical Note
REFERRAL APPROVAL NOTICE         Sent on June 20, 2025                   Aaron Khan  [address]  [place] [address]                   Dear Mr. Khan,    After a careful review of the medical information and benefit coverage, [facility] has processed your referral. See below for additional details.    If applicable, you must be actively enrolled with your insurance for coverage of the authorized service. If you have any questions regarding your coverage, please contact your insurance directly.    REFERRAL INFORMATION   Referral #:  [id number]  Referred-To Department    Referred-By Provider:  Dermatology    SHIRIN Serrano   [facility]      [address]  [place] [address]  [phone] [address]  [place] [id number] [phone]    Referral Start Date:  06/16/2025  Referral End Date:   06/16/2026             SCHEDULING  If you do not already have an appointment, please call [phone] to make an appointment.     MORE INFORMATION  If you do not already have a [employer] account, sign up at: [email or web address]  You can access your medical information, make appointments, see lab results, billing information, and more.  If you have questions regarding this referral, please contact  the [facility] Referrals department at:             [phone]. Monday - Friday 8:00AM - 5:00PM.     Sincerely,    [facility]

## 2025-07-09 ENCOUNTER — HOSPITAL ENCOUNTER (OUTPATIENT)
Dept: LAB | Facility: MEDICAL CENTER | Age: 45
End: 2025-07-09
Payer: COMMERCIAL

## 2025-07-09 DIAGNOSIS — Z13.0 SCREENING FOR ENDOCRINE, METABOLIC AND IMMUNITY DISORDER: ICD-10-CM

## 2025-07-09 DIAGNOSIS — Z11.4 ENCOUNTER FOR SCREENING FOR HIV: ICD-10-CM

## 2025-07-09 DIAGNOSIS — E55.9 VITAMIN D DEFICIENCY: ICD-10-CM

## 2025-07-09 DIAGNOSIS — Z13.6 SCREENING FOR CARDIOVASCULAR CONDITION: ICD-10-CM

## 2025-07-09 DIAGNOSIS — Z13.29 SCREENING FOR ENDOCRINE, METABOLIC AND IMMUNITY DISORDER: ICD-10-CM

## 2025-07-09 DIAGNOSIS — E66.9 OBESITY (BMI 30-39.9): ICD-10-CM

## 2025-07-09 DIAGNOSIS — Z11.59 NEED FOR HEPATITIS C SCREENING TEST: ICD-10-CM

## 2025-07-09 DIAGNOSIS — Z13.228 SCREENING FOR ENDOCRINE, METABOLIC AND IMMUNITY DISORDER: ICD-10-CM

## 2025-07-09 LAB
25(OH)D3 SERPL-MCNC: 42 NG/ML (ref 30–100)
ALBUMIN SERPL BCP-MCNC: 4.4 G/DL (ref 3.2–4.9)
ALBUMIN/GLOB SERPL: 1.4 G/DL
ALP SERPL-CCNC: 85 U/L (ref 30–99)
ALT SERPL-CCNC: 51 U/L (ref 2–50)
ANION GAP SERPL CALC-SCNC: 13 MMOL/L (ref 7–16)
AST SERPL-CCNC: 28 U/L (ref 12–45)
BILIRUB SERPL-MCNC: 0.4 MG/DL (ref 0.1–1.5)
BUN SERPL-MCNC: 19 MG/DL (ref 8–22)
CALCIUM ALBUM COR SERPL-MCNC: 8.9 MG/DL (ref 8.5–10.5)
CALCIUM SERPL-MCNC: 9.2 MG/DL (ref 8.5–10.5)
CHLORIDE SERPL-SCNC: 106 MMOL/L (ref 96–112)
CHOLEST SERPL-MCNC: 159 MG/DL (ref 100–199)
CO2 SERPL-SCNC: 21 MMOL/L (ref 20–33)
CREAT SERPL-MCNC: 1.02 MG/DL (ref 0.5–1.4)
ERYTHROCYTE [DISTWIDTH] IN BLOOD BY AUTOMATED COUNT: 41.8 FL (ref 35.9–50)
EST. AVERAGE GLUCOSE BLD GHB EST-MCNC: 114 MG/DL
GFR SERPLBLD CREATININE-BSD FMLA CKD-EPI: 93 ML/MIN/1.73 M 2
GLOBULIN SER CALC-MCNC: 3.1 G/DL (ref 1.9–3.5)
GLUCOSE SERPL-MCNC: 106 MG/DL (ref 65–99)
HBA1C MFR BLD: 5.6 % (ref 4–5.6)
HCT VFR BLD AUTO: 48.3 % (ref 42–52)
HCV AB SER QL: NORMAL
HDLC SERPL-MCNC: 38 MG/DL
HGB BLD-MCNC: 16.5 G/DL (ref 14–18)
HIV 1+2 AB+HIV1 P24 AG SERPL QL IA: NORMAL
LDLC SERPL CALC-MCNC: 96 MG/DL
MCH RBC QN AUTO: 30.4 PG (ref 27–33)
MCHC RBC AUTO-ENTMCNC: 34.2 G/DL (ref 32.3–36.5)
MCV RBC AUTO: 89.1 FL (ref 81.4–97.8)
PLATELET # BLD AUTO: 270 K/UL (ref 164–446)
PMV BLD AUTO: 11 FL (ref 9–12.9)
POTASSIUM SERPL-SCNC: 4.3 MMOL/L (ref 3.6–5.5)
PROT SERPL-MCNC: 7.5 G/DL (ref 6–8.2)
RBC # BLD AUTO: 5.42 M/UL (ref 4.7–6.1)
SODIUM SERPL-SCNC: 140 MMOL/L (ref 135–145)
TRIGL SERPL-MCNC: 126 MG/DL (ref 0–149)
TSH SERPL DL<=0.005 MIU/L-ACNC: 3.18 UIU/ML (ref 0.38–5.33)
WBC # BLD AUTO: 5.8 K/UL (ref 4.8–10.8)

## 2025-07-09 PROCEDURE — 85027 COMPLETE CBC AUTOMATED: CPT

## 2025-07-09 PROCEDURE — 84443 ASSAY THYROID STIM HORMONE: CPT

## 2025-07-09 PROCEDURE — 87389 HIV-1 AG W/HIV-1&-2 AB AG IA: CPT

## 2025-07-09 PROCEDURE — 36415 COLL VENOUS BLD VENIPUNCTURE: CPT

## 2025-07-09 PROCEDURE — 82306 VITAMIN D 25 HYDROXY: CPT

## 2025-07-09 PROCEDURE — 80061 LIPID PANEL: CPT

## 2025-07-09 PROCEDURE — 86803 HEPATITIS C AB TEST: CPT

## 2025-07-09 PROCEDURE — 83036 HEMOGLOBIN GLYCOSYLATED A1C: CPT

## 2025-07-09 PROCEDURE — 80053 COMPREHEN METABOLIC PANEL: CPT

## 2025-07-16 ENCOUNTER — APPOINTMENT (OUTPATIENT)
Dept: RADIOLOGY | Facility: MEDICAL CENTER | Age: 45
End: 2025-07-16
Payer: COMMERCIAL

## 2025-07-16 DIAGNOSIS — K40.91 UNILATERAL RECURRENT INGUINAL HERNIA WITHOUT OBSTRUCTION OR GANGRENE: ICD-10-CM

## 2025-07-16 PROCEDURE — 76857 US EXAM PELVIC LIMITED: CPT
